# Patient Record
Sex: MALE | URBAN - METROPOLITAN AREA
[De-identification: names, ages, dates, MRNs, and addresses within clinical notes are randomized per-mention and may not be internally consistent; named-entity substitution may affect disease eponyms.]

---

## 2023-09-20 ENCOUNTER — APPOINTMENT (OUTPATIENT)
Dept: URBAN - METROPOLITAN AREA SURGERY 25 | Age: 63
Setting detail: DERMATOLOGY
End: 2023-09-20

## 2023-09-20 DIAGNOSIS — L82.1 OTHER SEBORRHEIC KERATOSIS: ICD-10-CM

## 2023-09-20 DIAGNOSIS — D18.0 HEMANGIOMA: ICD-10-CM

## 2023-09-20 DIAGNOSIS — B07.8 OTHER VIRAL WARTS: ICD-10-CM

## 2023-09-20 DIAGNOSIS — D49.2 NEOPLASM OF UNSPECIFIED BEHAVIOR OF BONE, SOFT TISSUE, AND SKIN: ICD-10-CM

## 2023-09-20 PROBLEM — D18.01 HEMANGIOMA OF SKIN AND SUBCUTANEOUS TISSUE: Status: ACTIVE | Noted: 2023-09-20

## 2023-09-20 PROCEDURE — OTHER REASSURANCE: OTHER

## 2023-09-20 PROCEDURE — OTHER LIQUID NITROGEN: OTHER

## 2023-09-20 PROCEDURE — OTHER COUNSELING: OTHER

## 2023-09-20 PROCEDURE — OTHER BIOPSY BY SHAVE METHOD: OTHER

## 2023-09-20 PROCEDURE — 99202 OFFICE O/P NEW SF 15 MIN: CPT | Mod: 25

## 2023-09-20 PROCEDURE — 11102 TANGNTL BX SKIN SINGLE LES: CPT | Mod: 59

## 2023-09-20 PROCEDURE — 17110 DESTRUCT B9 LESION 1-14: CPT

## 2023-09-20 PROCEDURE — OTHER MIPS QUALITY: OTHER

## 2023-09-20 ASSESSMENT — LOCATION SIMPLE DESCRIPTION DERM
LOCATION SIMPLE: LEFT CHEEK
LOCATION SIMPLE: LEFT UPPER BACK
LOCATION SIMPLE: RIGHT FOREARM
LOCATION SIMPLE: UPPER BACK
LOCATION SIMPLE: CHEST

## 2023-09-20 ASSESSMENT — LOCATION ZONE DERM
LOCATION ZONE: ARM
LOCATION ZONE: FACE
LOCATION ZONE: TRUNK

## 2023-09-20 ASSESSMENT — LOCATION DETAILED DESCRIPTION DERM
LOCATION DETAILED: RIGHT PROXIMAL DORSAL FOREARM
LOCATION DETAILED: SUPERIOR THORACIC SPINE
LOCATION DETAILED: LEFT MEDIAL UPPER BACK
LOCATION DETAILED: STERNUM
LOCATION DETAILED: LEFT CENTRAL MALAR CHEEK

## 2023-09-20 NOTE — PROCEDURE: LIQUID NITROGEN
Render Post-Care Instructions In Note?: no
Show Applicator Variable?: Yes
Spray Paint Text: The liquid nitrogen was applied to the skin utilizing a spray paint frosting technique.
Consent: The patient's consent was obtained including but not limited to risks of crusting, scabbing, blistering, scarring, darker or lighter pigmentary change, recurrence, incomplete removal and infection.
Medical Necessity Information: It is in your best interest to select a reason for this procedure from the list below. All of these items fulfill various CMS LCD requirements except the new and changing color options.
Detail Level: Detailed
Post-Care Instructions: I reviewed with the patient in detail post-care instructions. Patient is to wear sunprotection, and avoid picking at any of the treated lesions. Pt may apply Vaseline to crusted or scabbing areas.
Medical Necessity Clause: This procedure was medically necessary because the lesions that were treated were:

## 2024-06-19 ENCOUNTER — APPOINTMENT (OUTPATIENT)
Dept: URBAN - METROPOLITAN AREA SURGERY 25 | Age: 64
Setting detail: DERMATOLOGY
End: 2024-06-19

## 2024-06-19 ENCOUNTER — RX ONLY (RX ONLY)
Age: 64
End: 2024-06-19

## 2024-06-19 DIAGNOSIS — L21.8 OTHER SEBORRHEIC DERMATITIS: ICD-10-CM

## 2024-06-19 DIAGNOSIS — L57.0 ACTINIC KERATOSIS: ICD-10-CM

## 2024-06-19 DIAGNOSIS — L82.1 OTHER SEBORRHEIC KERATOSIS: ICD-10-CM

## 2024-06-19 PROCEDURE — 17000 DESTRUCT PREMALG LESION: CPT | Mod: 59

## 2024-06-19 PROCEDURE — 99213 OFFICE O/P EST LOW 20 MIN: CPT | Mod: 25

## 2024-06-19 PROCEDURE — OTHER PRESCRIPTION: OTHER

## 2024-06-19 PROCEDURE — OTHER MIPS QUALITY: OTHER

## 2024-06-19 PROCEDURE — OTHER COUNSELING: OTHER

## 2024-06-19 PROCEDURE — OTHER LIQUID NITROGEN: OTHER

## 2024-06-19 PROCEDURE — 17110 DESTRUCT B9 LESION 1-14: CPT

## 2024-06-19 RX ORDER — KETOCONAZOLE 20 MG/G
CREAM TOPICAL
Qty: 60 | Refills: 5 | Status: ERX | COMMUNITY
Start: 2024-06-19

## 2024-06-19 RX ORDER — KETOCONAZOLE 20 MG/G
CREAM TOPICAL BID
Qty: 60 | Refills: 5 | Status: CANCELLED | COMMUNITY
Start: 2024-06-19

## 2024-06-19 ASSESSMENT — LOCATION SIMPLE DESCRIPTION DERM
LOCATION SIMPLE: LEFT CHEEK
LOCATION SIMPLE: RIGHT FOREARM
LOCATION SIMPLE: RIGHT FOREHEAD
LOCATION SIMPLE: RIGHT CHEEK
LOCATION SIMPLE: LEFT FOREHEAD

## 2024-06-19 ASSESSMENT — LOCATION DETAILED DESCRIPTION DERM
LOCATION DETAILED: LEFT INFERIOR LATERAL MALAR CHEEK
LOCATION DETAILED: RIGHT DISTAL RADIAL DORSAL FOREARM
LOCATION DETAILED: RIGHT INFERIOR FOREHEAD
LOCATION DETAILED: LEFT MEDIAL MALAR CHEEK
LOCATION DETAILED: RIGHT MEDIAL MALAR CHEEK
LOCATION DETAILED: LEFT INFERIOR MEDIAL FOREHEAD

## 2024-06-19 ASSESSMENT — LOCATION ZONE DERM
LOCATION ZONE: FACE
LOCATION ZONE: ARM